# Patient Record
Sex: MALE | Race: AMERICAN INDIAN OR ALASKA NATIVE
[De-identification: names, ages, dates, MRNs, and addresses within clinical notes are randomized per-mention and may not be internally consistent; named-entity substitution may affect disease eponyms.]

---

## 2022-09-30 ENCOUNTER — HOSPITAL ENCOUNTER (EMERGENCY)
Dept: HOSPITAL 5 - ED | Age: 27
Discharge: HOME | End: 2022-09-30
Payer: SELF-PAY

## 2022-09-30 VITALS — SYSTOLIC BLOOD PRESSURE: 114 MMHG | DIASTOLIC BLOOD PRESSURE: 66 MMHG

## 2022-09-30 DIAGNOSIS — M25.571: Primary | ICD-10-CM

## 2022-09-30 DIAGNOSIS — Z79.899: ICD-10-CM
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
## 2022-09-30 PROCEDURE — 99283 EMERGENCY DEPT VISIT LOW MDM: CPT

## 2022-09-30 NOTE — EMERGENCY DEPARTMENT REPORT
ED Lower Extremity HPI





- General


Chief Complaint: Extremity Injury, Lower


Stated Complaint: R ANKLE SWOLLEN/PAIN


Time Seen by Provider: 09/30/22 13:36


Source: patient, EMS ( EMS documentation not available at time of chart 

dictation ), RN notes reviewed


Mode of arrival: Ambulatory


Limitations: Physical Limitation





- History of Present Illness


Initial Comments: 





This is a pleasant and cooperative 27-year-old gentleman who presents to the 

department today with complaint of right ankle pain, after jumping from the roof

of her car onto her trunk yesterday.  No other additional injuries or 

complaints.  Pain is sharp and throbbing, increases with palpation, decreases 

with rest.








MD Complaint: ankle injury


-: Sudden, days(s)


Injury: Hip: Right, Ankle: Right


Type of Injury: blunt


Place: street/outdoors


Severity: severe


Improves With: rest


Worsens With: movement, palpation


Context: fall, direct blow


Associated Symptoms: swelling





- Related Data


                                  Previous Rx's











 Medication  Instructions  Recorded  Last Taken  Type


 


Acetaminophen [Acetaminophen  mg PO Q6HR PRN #30 tab 09/30/22 Unknown Rx





TAB]    


 


Naproxen [Naprosyn] 500 mg PO BID PRN #30 tab 09/30/22 Unknown Rx











                                    Allergies











Allergy/AdvReac Type Severity Reaction Status Date / Time


 


No Known Allergies Allergy   Unverified 09/30/22 11:09














ED Review of Systems


ROS: 


Stated complaint: R ANKLE SWOLLEN/PAIN


Other details as noted in HPI





Musculoskeletal: joint swelling, arthralgia, myalgia





ED Past Medical Hx





- Past Medical History


Previous Medical History?: No





- Medications


Home Medications: 


                                Home Medications











 Medication  Instructions  Recorded  Confirmed  Last Taken  Type


 


Acetaminophen [Acetaminophen  mg PO Q6HR PRN #30 tab 09/30/22  Unknown Rx





TAB]     


 


Naproxen [Naprosyn] 500 mg PO BID PRN #30 tab 09/30/22  Unknown Rx














ED Physical Exam





- General


Limitations: No Limitations, Physical Limitation


General appearance: alert, in no apparent distress





- Head


Head exam: Present: atraumatic, normocephalic





- Eye


Eye exam: Present: normal appearance, EOMI.  Absent: nystagmus





- ENT


ENT exam: Present: normal exam, normal orophraynx, mucous membranes moist, 

normal external ear exam





- Neck


Neck exam: Present: normal inspection, full ROM.  Absent: tenderness, 

meningismus





- Respiratory


Respiratory exam: Present: normal lung sounds bilaterally.  Absent: respiratory 

distress, wheezes, rales, rhonchi, stridor, decreased breath sounds





- Cardiovascular


Cardiovascular Exam: Present: regular rate, normal rhythm, normal heart sounds. 

 Absent: bradycardia, tachycardia, irregular rhythm, systolic murmur, diastolic 

murmur, rubs, gallop





- GI/Abdominal


GI/Abdominal exam: Present: soft.  Absent: distended, tenderness, guarding, 

rebound, rigid, pulsatile mass





- Rectal


Rectal exam: Present: deferred





- Extremities Exam


Extremities exam: Present: normal inspection, full ROM (Bilateral upper 

extremities.  Left lower extremity), tenderness (Right medial, lateral, anterior

 ankle), normal capillary refill, other (2+ pulses noted in the bilateral upper 

and lower extremities.  The upper extremities are nontender.  The left lower 

extremity is nontender.  The pelvis is stable.  Right lower extremity nontender,

 with exception of the right ankle).  Absent: pedal edema, calf tenderness





- Back Exam


Back exam: Present: normal inspection.  Absent: tenderness, CVA tenderness (R), 

CVA tenderness (L), paraspinal tenderness, vertebral tenderness





- Neurological Exam


Neurological exam: Present: alert, oriented X3, other (No facial droop.  Tongue 

midline.  Extraocular movements intact bilaterally.  Facial sensation intact to 

light touch in V1, V2, V3 distribution bilaterally.  5 and a 5 strength in 4 

extremities.  Sensation intact to light touch in 4 extremities.).  Absent: motor

 sensory deficit





- Psychiatric


Psychiatric exam: Present: normal affect, normal mood





- Skin


Skin exam: Present: warm, dry, intact, normal color.  Absent: rash





ED Course





                                   Vital Signs











  09/30/22





  11:08


 


Temperature 97.6 F


 


Pulse Rate 80


 


Respiratory 18





Rate 


 


Blood Pressure 114/66





[Left] 


 


O2 Sat by Pulse 97





Oximetry 














ED Lower Extremity MDM





- Lab Data








                                   Vital Signs











  09/30/22





  11:08


 


Temperature 97.6 F


 


Pulse Rate 80


 


Respiratory 18





Rate 


 


Blood Pressure 114/66





[Left] 


 


O2 Sat by Pulse 97





Oximetry 














- Radiology Data


Radiology results: pending, report reviewed, image reviewed





XR ankle 3+V RT  INDICATION / CLINICAL INFORMATION: injury, pain, swelling  

COMPARISON: None available.  FINDINGS:  BONES / JOINT(S): No acute fracture or 

subluxation. Ankle mortise is symmetric. No significant arthritis.  SOFT 

TISSUES: No significant abnormality.  ADDITIONAL FINDINGS: None.  IMPRESSION: No

 acute osseous findings in the right ankle.  Signer Name: Ozzy Sarah MD 

Signed: 9/30/2022 10:43 AM Workstation Name: [id number]





- Medical Decision Making





Differential diagnosis, including but not limited to: Sprain, strain, fracture, 

dislocation





Assessment and plan: 27-year-old gentleman with isolated right ankle pain after 

mechanical fall yesterday.  No additional injuries or complaints.  He is 

neurovascularly intact.  X-rays show no fracture or dislocation.  Crutches, 

nonweightbearing, rest, ice, compression, elevation, ankle splint, outpatient 

primary care, or sports medicine follow-up.  Return precautions are reviewed.  

Patient does not appear to have an emergent medical condition present at this 

time


Critical care attestation.: 


If time is entered above; I have spent that time in minutes in the direct care 

of this critically ill patient, excluding procedure time.








ED Disposition


Clinical Impression: 


 Right ankle pain





Disposition: 01 HOME / SELF CARE / HOMELESS


Is pt being admited?: No


Does the pt Need Aspirin: No


Condition: Good


Instructions:  Joint Pain, Easy-to-Read


Additional Instructions: 


 pain typically gets worse before it gets better after mild blunt trauma rest 

and avoid heavy lifting, and avoid strenuous physical activity.  Engage in 

physical activities as tolerated.





For pain, the patient can take ibuprofen, 600 mg with food every 6 hours, 

alternating with acetaminophen, 650 mg every 4 hours, also which can be 

purchased over-the-counter.  Return to the ER right away with new pain, worsened

 pain, migration of pain, fevers, chills, confusion, weakness, numbness, 

intractable nausea or vomiting, severe chest pain, or severe abdominal pain.








Alternatively, may take the prescribed Naprosyn.  However, patient should not 

combine Naprosyn and ibuprofen.  Patient should only take 1 or the other.





Patient may combine Naprosyn, or ibuprofen, with Tylenol/acetaminophen, as 

directed.





Use the crutches as directed.  Remain nonweightbearing on the right lower 

extremity.  Follow-up with a primary care doctor, orthopedist, or sports 

medicine physician within the next 5 to 7 days for repeat checkup and evaluation








Please return to the emergency room right away with new pain, worsened pain, 

migration of pain, projectile vomiting, change in mental status, confusion, 

inability tolerate liquid feeds, new, worsened or different symptoms not present

 on the initial emergency room evaluation


Referrals: 


DAVID SMITH MD [Staff Physician] - 3-5 Days


[facility] [Provider Group] - 3-5 Days


[facility] [Provider Group] - 3-5 Days


Forms:  Work/School Release Form(ED)

## 2022-09-30 NOTE — XRAY REPORT
XR ankle 3+V RT



INDICATION / CLINICAL INFORMATION:

injury, pain, swelling



COMPARISON:

None available.

 

FINDINGS:



BONES / JOINT(S): No acute fracture or subluxation. Ankle mortise is symmetric. No significant arthri
tis. 



SOFT TISSUES: No significant abnormality.



ADDITIONAL FINDINGS: None.



IMPRESSION:

No acute osseous findings in the right ankle. 



Signer Name: Ozzy Sarah MD 

Signed: 9/30/2022 11:43 AM

Workstation Name: [email or web address]